# Patient Record
Sex: FEMALE
[De-identification: names, ages, dates, MRNs, and addresses within clinical notes are randomized per-mention and may not be internally consistent; named-entity substitution may affect disease eponyms.]

---

## 2019-10-11 ENCOUNTER — HOSPITAL ENCOUNTER (OUTPATIENT)
Dept: HOSPITAL 47 - RADMAMWWP | Age: 39
Discharge: HOME | End: 2019-10-11
Attending: FAMILY MEDICINE
Payer: COMMERCIAL

## 2019-10-11 DIAGNOSIS — Z12.31: Primary | ICD-10-CM

## 2019-10-11 PROCEDURE — 77063 BREAST TOMOSYNTHESIS BI: CPT

## 2019-10-11 PROCEDURE — 77067 SCR MAMMO BI INCL CAD: CPT

## 2019-10-12 ENCOUNTER — HOSPITAL ENCOUNTER (OUTPATIENT)
Dept: HOSPITAL 47 - LABWHC1 | Age: 39
Discharge: HOME | End: 2019-10-12
Attending: NURSE PRACTITIONER
Payer: COMMERCIAL

## 2019-10-12 DIAGNOSIS — Z13.29: ICD-10-CM

## 2019-10-12 DIAGNOSIS — Z13.220: ICD-10-CM

## 2019-10-12 DIAGNOSIS — Z00.00: Primary | ICD-10-CM

## 2019-10-12 LAB
ALBUMIN SERPL-MCNC: 4.1 G/DL (ref 3.8–4.9)
ALBUMIN/GLOB SERPL: 1.95 G/DL (ref 1.6–3.17)
ALP SERPL-CCNC: 63 U/L (ref 41–126)
ALT SERPL-CCNC: 26 U/L (ref 8–44)
ANION GAP SERPL CALC-SCNC: 4.9 MMOL/L (ref 4–12)
AST SERPL-CCNC: 22 U/L (ref 13–35)
BASOPHILS # BLD AUTO: 0 K/UL (ref 0–0.2)
BASOPHILS NFR BLD AUTO: 1 %
BUN SERPL-SCNC: 9 MG/DL (ref 9–27)
BUN/CREAT SERPL: 12.86 RATIO (ref 12–20)
CALCIUM SPEC-MCNC: 9.4 MG/DL (ref 8.7–10.3)
CHLORIDE SERPL-SCNC: 107 MMOL/L (ref 96–109)
CHOLEST SERPL-MCNC: 162 MG/DL (ref 0–200)
CO2 SERPL-SCNC: 30.1 MMOL/L (ref 21.6–31.8)
EOSINOPHIL # BLD AUTO: 0.1 K/UL (ref 0–0.7)
EOSINOPHIL NFR BLD AUTO: 2 %
ERYTHROCYTE [DISTWIDTH] IN BLOOD BY AUTOMATED COUNT: 4.83 M/UL (ref 3.8–5.4)
ERYTHROCYTE [DISTWIDTH] IN BLOOD: 12.3 % (ref 11.5–15.5)
GLOBULIN SER CALC-MCNC: 2.1 G/DL (ref 1.6–3.3)
GLUCOSE SERPL-MCNC: 127 MG/DL (ref 70–110)
HCT VFR BLD AUTO: 45.4 % (ref 34–46)
HDLC SERPL-MCNC: 38 MG/DL (ref 40–60)
HGB BLD-MCNC: 15.3 GM/DL (ref 11.4–16)
LDLC SERPL CALC-MCNC: 101.8 MG/DL (ref 0–131)
LYMPHOCYTES # SPEC AUTO: 3 K/UL (ref 1–4.8)
LYMPHOCYTES NFR SPEC AUTO: 40 %
MCH RBC QN AUTO: 31.7 PG (ref 25–35)
MCHC RBC AUTO-ENTMCNC: 33.8 G/DL (ref 31–37)
MCV RBC AUTO: 94 FL (ref 80–100)
MONOCYTES # BLD AUTO: 0.3 K/UL (ref 0–1)
MONOCYTES NFR BLD AUTO: 3 %
NEUTROPHILS # BLD AUTO: 4 K/UL (ref 1.3–7.7)
NEUTROPHILS NFR BLD AUTO: 53 %
PLATELET # BLD AUTO: 295 K/UL (ref 150–450)
POTASSIUM SERPL-SCNC: 3.9 MMOL/L (ref 3.5–5.5)
PROT SERPL-MCNC: 6.2 G/DL (ref 6.2–8.2)
SODIUM SERPL-SCNC: 142 MMOL/L (ref 135–145)
TRIGL SERPL-MCNC: 111 MG/DL (ref 0–149)
VLDLC SERPL CALC-MCNC: 22.2 MG/DL (ref 5–40)
WBC # BLD AUTO: 7.5 K/UL (ref 3.8–10.6)

## 2019-10-12 PROCEDURE — 36415 COLL VENOUS BLD VENIPUNCTURE: CPT

## 2019-10-12 PROCEDURE — 80053 COMPREHEN METABOLIC PANEL: CPT

## 2019-10-12 PROCEDURE — 84443 ASSAY THYROID STIM HORMONE: CPT

## 2019-10-12 PROCEDURE — 85025 COMPLETE CBC W/AUTO DIFF WBC: CPT

## 2019-10-12 PROCEDURE — 80061 LIPID PANEL: CPT

## 2019-10-14 NOTE — MM
Reason for exam: screening  (asymptomatic).

Baseline mammogram.



Physical Findings:

Nurse did not find any significant physical abnormalities on exam.



MG 3D Screening Mammo W/Cad

Bilateral CC, MLO, and XCCL view(s) were taken.

The breast tissue is heterogeneously dense. This may lower the sensitivity of 

mammography.  Benign appearing bilateral calcifications. No suspicious 

abnormality.



These results were verbally communicated with the patient and result sheet given 

to the patient on 10/11/19.





ASSESSMENT: Benign, BI-RAD 2



RECOMMENDATION:

Routine screening mammogram of both breasts in 1 year.

## 2021-01-05 ENCOUNTER — HOSPITAL ENCOUNTER (OUTPATIENT)
Dept: HOSPITAL 47 - RADMAMWWP | Age: 41
Discharge: HOME | End: 2021-01-05
Attending: FAMILY MEDICINE
Payer: COMMERCIAL

## 2021-01-05 DIAGNOSIS — Z12.31: Primary | ICD-10-CM

## 2021-01-05 PROCEDURE — 77067 SCR MAMMO BI INCL CAD: CPT

## 2021-01-05 PROCEDURE — 77063 BREAST TOMOSYNTHESIS BI: CPT

## 2021-01-06 NOTE — MM
Reason for exam: screening  (asymptomatic).

Last mammogram was performed 1 year and 3 months ago.



Physical Findings:

A clinical breast exam by your physician is recommended on an annual basis and 

results should be correlated with mammographic findings.



MG 3D Screening Mammo W/Cad

Bilateral CC and MLO view(s) were taken.

Prior study comparison: October 11, 2019, bilateral MG 3d screening mammo w/cad.

The breast tissue is heterogeneously dense. This may lower the sensitivity of 

mammography.  There are benign appearing round dystrophic calcifications 

bilaterally. There is no discrete abnormality.





ASSESSMENT: Benign, BI-RAD 2



RECOMMENDATION:

Routine screening mammogram of both breasts in 1 year.

## 2022-01-07 ENCOUNTER — HOSPITAL ENCOUNTER (OUTPATIENT)
Dept: HOSPITAL 47 - RADMAMWWP | Age: 42
Discharge: HOME | End: 2022-01-07
Attending: FAMILY MEDICINE
Payer: COMMERCIAL

## 2022-01-07 DIAGNOSIS — Z80.3: ICD-10-CM

## 2022-01-07 DIAGNOSIS — Z12.31: Primary | ICD-10-CM

## 2022-01-07 PROCEDURE — 77063 BREAST TOMOSYNTHESIS BI: CPT

## 2022-01-07 PROCEDURE — 77067 SCR MAMMO BI INCL CAD: CPT

## 2022-01-10 NOTE — MM
Reason for exam: screening  (asymptomatic).

Last mammogram was performed 1 year ago.



History:

Family history of breast cancer in maternal grandmother.



Physical Findings:

A clinical breast exam by your physician is recommended on an annual basis and 

results should be correlated with mammographic findings.



MG 3D Screening Mammo W/Cad

Bilateral CC, MLO, and XCCL view(s) were taken.

Prior study comparison: January 5, 2021, bilateral MG 3d screening mammo w/cad.  

October 11, 2019, bilateral MG 3d screening mammo w/cad.

There are scattered fibroglandular densities.  No significant changes when 

compared with prior studies.





ASSESSMENT: Negative, BI-RAD 1



RECOMMENDATION:

Routine screening mammogram of both breasts in 1 year.